# Patient Record
Sex: MALE | Race: WHITE | NOT HISPANIC OR LATINO | Employment: FULL TIME
[De-identification: names, ages, dates, MRNs, and addresses within clinical notes are randomized per-mention and may not be internally consistent; named-entity substitution may affect disease eponyms.]

---

## 2020-03-01 ENCOUNTER — HEALTH MAINTENANCE LETTER (OUTPATIENT)
Age: 25
End: 2020-03-01

## 2020-12-14 ENCOUNTER — HEALTH MAINTENANCE LETTER (OUTPATIENT)
Age: 25
End: 2020-12-14

## 2021-04-18 ENCOUNTER — HEALTH MAINTENANCE LETTER (OUTPATIENT)
Age: 26
End: 2021-04-18

## 2021-10-02 ENCOUNTER — HEALTH MAINTENANCE LETTER (OUTPATIENT)
Age: 26
End: 2021-10-02

## 2022-05-14 ENCOUNTER — HEALTH MAINTENANCE LETTER (OUTPATIENT)
Age: 27
End: 2022-05-14

## 2022-09-03 ENCOUNTER — HEALTH MAINTENANCE LETTER (OUTPATIENT)
Age: 27
End: 2022-09-03

## 2023-01-18 SDOH — ECONOMIC STABILITY: INCOME INSECURITY: HOW HARD IS IT FOR YOU TO PAY FOR THE VERY BASICS LIKE FOOD, HOUSING, MEDICAL CARE, AND HEATING?: NOT HARD AT ALL

## 2023-01-18 SDOH — ECONOMIC STABILITY: TRANSPORTATION INSECURITY
IN THE PAST 12 MONTHS, HAS LACK OF TRANSPORTATION KEPT YOU FROM MEETINGS, WORK, OR FROM GETTING THINGS NEEDED FOR DAILY LIVING?: NO

## 2023-01-18 SDOH — HEALTH STABILITY: PHYSICAL HEALTH: ON AVERAGE, HOW MANY MINUTES DO YOU ENGAGE IN EXERCISE AT THIS LEVEL?: 0 MIN

## 2023-01-18 SDOH — ECONOMIC STABILITY: TRANSPORTATION INSECURITY
IN THE PAST 12 MONTHS, HAS THE LACK OF TRANSPORTATION KEPT YOU FROM MEDICAL APPOINTMENTS OR FROM GETTING MEDICATIONS?: NO

## 2023-01-18 SDOH — HEALTH STABILITY: PHYSICAL HEALTH: ON AVERAGE, HOW MANY DAYS PER WEEK DO YOU ENGAGE IN MODERATE TO STRENUOUS EXERCISE (LIKE A BRISK WALK)?: 0 DAYS

## 2023-01-18 SDOH — ECONOMIC STABILITY: FOOD INSECURITY: WITHIN THE PAST 12 MONTHS, YOU WORRIED THAT YOUR FOOD WOULD RUN OUT BEFORE YOU GOT MONEY TO BUY MORE.: NEVER TRUE

## 2023-01-18 SDOH — ECONOMIC STABILITY: INCOME INSECURITY: IN THE LAST 12 MONTHS, WAS THERE A TIME WHEN YOU WERE NOT ABLE TO PAY THE MORTGAGE OR RENT ON TIME?: NO

## 2023-01-18 SDOH — ECONOMIC STABILITY: FOOD INSECURITY: WITHIN THE PAST 12 MONTHS, THE FOOD YOU BOUGHT JUST DIDN'T LAST AND YOU DIDN'T HAVE MONEY TO GET MORE.: NEVER TRUE

## 2023-01-18 ASSESSMENT — SOCIAL DETERMINANTS OF HEALTH (SDOH)
IN A TYPICAL WEEK, HOW MANY TIMES DO YOU TALK ON THE PHONE WITH FAMILY, FRIENDS, OR NEIGHBORS?: MORE THAN THREE TIMES A WEEK
DO YOU BELONG TO ANY CLUBS OR ORGANIZATIONS SUCH AS CHURCH GROUPS UNIONS, FRATERNAL OR ATHLETIC GROUPS, OR SCHOOL GROUPS?: NO
ARE YOU MARRIED, WIDOWED, DIVORCED, SEPARATED, NEVER MARRIED, OR LIVING WITH A PARTNER?: NEVER MARRIED
HOW OFTEN DO YOU GET TOGETHER WITH FRIENDS OR RELATIVES?: TWICE A WEEK
HOW OFTEN DO YOU ATTEND CHURCH OR RELIGIOUS SERVICES?: NEVER

## 2023-01-18 ASSESSMENT — LIFESTYLE VARIABLES
AUDIT-C TOTAL SCORE: 0
HOW OFTEN DO YOU HAVE SIX OR MORE DRINKS ON ONE OCCASION: NEVER
SKIP TO QUESTIONS 9-10: 1
HOW MANY STANDARD DRINKS CONTAINING ALCOHOL DO YOU HAVE ON A TYPICAL DAY: PATIENT DOES NOT DRINK
HOW OFTEN DO YOU HAVE A DRINK CONTAINING ALCOHOL: NEVER

## 2023-01-18 ASSESSMENT — ENCOUNTER SYMPTOMS
HEARTBURN: 0
SHORTNESS OF BREATH: 0
EYE PAIN: 0
HEMATOCHEZIA: 0
ABDOMINAL PAIN: 0
ARTHRALGIAS: 0
HEADACHES: 0
NAUSEA: 0
FREQUENCY: 1
JOINT SWELLING: 0
PALPITATIONS: 0
DIARRHEA: 0
MYALGIAS: 0
FEVER: 0
HEMATURIA: 0
PARESTHESIAS: 0
COUGH: 0
DIZZINESS: 0
CONSTIPATION: 0
SORE THROAT: 0
WEAKNESS: 0
CHILLS: 0
NERVOUS/ANXIOUS: 0
DYSURIA: 1

## 2023-01-19 ENCOUNTER — OFFICE VISIT (OUTPATIENT)
Dept: PEDIATRICS | Facility: CLINIC | Age: 28
End: 2023-01-19
Payer: COMMERCIAL

## 2023-01-19 VITALS
WEIGHT: 234 LBS | BODY MASS INDEX: 30.03 KG/M2 | RESPIRATION RATE: 16 BRPM | HEART RATE: 90 BPM | SYSTOLIC BLOOD PRESSURE: 122 MMHG | OXYGEN SATURATION: 98 % | HEIGHT: 74 IN | DIASTOLIC BLOOD PRESSURE: 84 MMHG | TEMPERATURE: 98.3 F

## 2023-01-19 DIAGNOSIS — Z00.00 ENCOUNTER FOR ROUTINE ADULT HEALTH EXAMINATION WITHOUT ABNORMAL FINDINGS: Primary | ICD-10-CM

## 2023-01-19 DIAGNOSIS — Z11.4 SCREENING FOR HIV (HUMAN IMMUNODEFICIENCY VIRUS): ICD-10-CM

## 2023-01-19 DIAGNOSIS — R30.0 DYSURIA: ICD-10-CM

## 2023-01-19 DIAGNOSIS — Z11.59 NEED FOR HEPATITIS C SCREENING TEST: ICD-10-CM

## 2023-01-19 LAB
ALBUMIN SERPL BCG-MCNC: 4.7 G/DL (ref 3.5–5.2)
ALBUMIN UR-MCNC: NEGATIVE MG/DL
ALP SERPL-CCNC: 89 U/L (ref 40–129)
ALT SERPL W P-5'-P-CCNC: 34 U/L (ref 10–50)
ANION GAP SERPL CALCULATED.3IONS-SCNC: 15 MMOL/L (ref 7–15)
APPEARANCE UR: CLEAR
AST SERPL W P-5'-P-CCNC: 28 U/L (ref 10–50)
BILIRUB SERPL-MCNC: 0.4 MG/DL
BILIRUB UR QL STRIP: NEGATIVE
BUN SERPL-MCNC: 10.8 MG/DL (ref 6–20)
CALCIUM SERPL-MCNC: 9.8 MG/DL (ref 8.6–10)
CHLORIDE SERPL-SCNC: 106 MMOL/L (ref 98–107)
CHOLEST SERPL-MCNC: 250 MG/DL
COLOR UR AUTO: YELLOW
CREAT SERPL-MCNC: 1.09 MG/DL (ref 0.67–1.17)
DEPRECATED HCO3 PLAS-SCNC: 21 MMOL/L (ref 22–29)
GFR SERPL CREATININE-BSD FRML MDRD: >90 ML/MIN/1.73M2
GLUCOSE SERPL-MCNC: 96 MG/DL (ref 70–99)
GLUCOSE UR STRIP-MCNC: NEGATIVE MG/DL
HBA1C MFR BLD: 5 % (ref 0–5.6)
HCV AB SERPL QL IA: NONREACTIVE
HDLC SERPL-MCNC: 38 MG/DL
HGB UR QL STRIP: NEGATIVE
HIV 1+2 AB+HIV1 P24 AG SERPL QL IA: NONREACTIVE
KETONES UR STRIP-MCNC: NEGATIVE MG/DL
LDLC SERPL CALC-MCNC: 193 MG/DL
LEUKOCYTE ESTERASE UR QL STRIP: NEGATIVE
NITRATE UR QL: NEGATIVE
NONHDLC SERPL-MCNC: 212 MG/DL
PH UR STRIP: 5.5 [PH] (ref 5–7)
POTASSIUM SERPL-SCNC: 4.3 MMOL/L (ref 3.4–5.3)
PROT SERPL-MCNC: 7.6 G/DL (ref 6.4–8.3)
RBC #/AREA URNS AUTO: NORMAL /HPF
SODIUM SERPL-SCNC: 142 MMOL/L (ref 136–145)
SP GR UR STRIP: 1.01 (ref 1–1.03)
TRIGL SERPL-MCNC: 93 MG/DL
UROBILINOGEN UR STRIP-ACNC: 0.2 E.U./DL
WBC #/AREA URNS AUTO: NORMAL /HPF

## 2023-01-19 PROCEDURE — 81001 URINALYSIS AUTO W/SCOPE: CPT | Performed by: INTERNAL MEDICINE

## 2023-01-19 PROCEDURE — 91312 COVID-19 VACCINE BIVALENT BOOSTER 12+ (PFIZER): CPT | Performed by: INTERNAL MEDICINE

## 2023-01-19 PROCEDURE — 80061 LIPID PANEL: CPT | Performed by: INTERNAL MEDICINE

## 2023-01-19 PROCEDURE — 80053 COMPREHEN METABOLIC PANEL: CPT | Performed by: INTERNAL MEDICINE

## 2023-01-19 PROCEDURE — 87491 CHLMYD TRACH DNA AMP PROBE: CPT | Performed by: INTERNAL MEDICINE

## 2023-01-19 PROCEDURE — 86803 HEPATITIS C AB TEST: CPT | Performed by: INTERNAL MEDICINE

## 2023-01-19 PROCEDURE — 83036 HEMOGLOBIN GLYCOSYLATED A1C: CPT | Performed by: INTERNAL MEDICINE

## 2023-01-19 PROCEDURE — 90471 IMMUNIZATION ADMIN: CPT | Performed by: INTERNAL MEDICINE

## 2023-01-19 PROCEDURE — 99385 PREV VISIT NEW AGE 18-39: CPT | Performed by: INTERNAL MEDICINE

## 2023-01-19 PROCEDURE — 0124A COVID-19 VACCINE BIVALENT BOOSTER 12+ (PFIZER): CPT | Performed by: INTERNAL MEDICINE

## 2023-01-19 PROCEDURE — 90686 IIV4 VACC NO PRSV 0.5 ML IM: CPT | Performed by: INTERNAL MEDICINE

## 2023-01-19 PROCEDURE — 87389 HIV-1 AG W/HIV-1&-2 AB AG IA: CPT | Performed by: INTERNAL MEDICINE

## 2023-01-19 PROCEDURE — 36415 COLL VENOUS BLD VENIPUNCTURE: CPT | Performed by: INTERNAL MEDICINE

## 2023-01-19 PROCEDURE — 87591 N.GONORRHOEAE DNA AMP PROB: CPT | Performed by: INTERNAL MEDICINE

## 2023-01-19 PROCEDURE — 99213 OFFICE O/P EST LOW 20 MIN: CPT | Mod: 25 | Performed by: INTERNAL MEDICINE

## 2023-01-19 ASSESSMENT — ENCOUNTER SYMPTOMS
ARTHRALGIAS: 0
FEVER: 0
PARESTHESIAS: 0
HEMATURIA: 0
PALPITATIONS: 0
DYSURIA: 1
ABDOMINAL PAIN: 0
NAUSEA: 0
SHORTNESS OF BREATH: 0
COUGH: 0
DIARRHEA: 0
HEMATOCHEZIA: 0
FREQUENCY: 1
HEARTBURN: 0
JOINT SWELLING: 0
NERVOUS/ANXIOUS: 0
EYE PAIN: 0
DIZZINESS: 0
SORE THROAT: 0
CONSTIPATION: 0
CHILLS: 0
WEAKNESS: 0
MYALGIAS: 0
HEADACHES: 0

## 2023-01-19 ASSESSMENT — PAIN SCALES - GENERAL: PAINLEVEL: NO PAIN (0)

## 2023-01-19 NOTE — PATIENT INSTRUCTIONS
"Cardio exercise is probably the most helpful thing for your heart and future health.  Try to get about 30 minutes of sustained cardio exercise (biking, running, swimming, fast walking) every other day or even every day.  Keeping your heart rate at a \"target\" of (220 - your age) x 0.80 will be your goal.  For example, if you're 60 years old, this would be (220 - 60) x 0.80 = 128 beats per minute for those 30 minutes of exercise.     Try to limit complex carbs (rice, bread, pasta, potatoes) and simple carbs (pop, juice, alcohol.)  Eating more lean proteins (chicken, fish, eggs, beans, nuts) and unlimited vegetables and fruits can definitely help as well.      Lab work today:  We can do labs in the exam room today, or you can get them done downstairs in the lab.      If you are going downstairs:  Directions:  As you walk through the first floor, you'll see (on the right) first the pharmacy, then some bathrooms, then the \"Lab and Imaging\" area. Give them your name at the window there and wait for them to call you.           "

## 2023-01-19 NOTE — PROGRESS NOTES
SUBJECTIVE:   CC: Ray is an 27 year old who presents for preventative health visit.   Patient has been advised of split billing requirements and indicates understanding: Yes  Healthy Habits:     Getting at least 3 servings of Calcium per day:  NO    Bi-annual eye exam:  Yes    Dental care twice a year:  NO    Sleep apnea or symptoms of sleep apnea:  Excessive snoring    Diet:  Regular (no restrictions)    Frequency of exercise:  None    Duration of exercise:  N/A    Taking medications regularly:  Yes    Barriers to taking medications:  None    Medication side effects:  Not applicable    PHQ-2 Total Score: 0    Additional concerns today:  Yes    Last few weeks has had some discomfort with penis; feels a bit of stinging near tip; does not hurt to urinate or ejaculate.  Feels like has some urgency, but not more volume.      No cloudiness or blood.  No exposure to sexually transmitted diseases.     No exercise.    Diet: not healthy; minimal fruits and veg; lots of meats and pasta            Today's PHQ-2 Score:   PHQ-2 ( 1999 [employer]) 1/18/2023   Q1: Little interest or pleasure in doing things 0   Q2: Feeling down, depressed or hopeless 0   PHQ-2 Score 0   Q1: Little interest or pleasure in doing things Several days   Q2: Feeling down, depressed or hopeless Not at all   PHQ-2 Score 1       Have you ever done Advance Care Planning? (For example, a Health Directive, POLST, or a discussion with a medical provider or your loved ones about your wishes): No, advance care planning information given to patient to review.  Patient declined advance care planning discussion at this time.    Social History     Tobacco Use     Smoking status: Never     Smokeless tobacco: Never   Substance Use Topics     Alcohol use: Yes     Comment: occasional     If you drink alcohol do you typically have >3 drinks per day or >7 drinks per week? No    Alcohol Use 1/19/2023   Prescreen: >3 drinks/day or >7 drinks/week? -   Prescreen: >3  drinks/day or >7 drinks/week? No       Last PSA: No results found for: PSA    Reviewed orders with patient. Reviewed health maintenance and updated orders accordingly - Yes  Lab work is in process  Labs reviewed in EPIC  BP Readings from Last 3 Encounters:   01/19/23 122/84   06/07/16 110/70   01/08/16 102/60    Wt Readings from Last 3 Encounters:   01/19/23 106.1 kg (234 lb)   06/07/16 86.2 kg (190 lb)   01/08/16 80.5 kg (177 lb 6 oz)                  There is no problem list on file for this patient.    No past surgical history on file.    Social History     Tobacco Use     Smoking status: Never     Smokeless tobacco: Never   Substance Use Topics     Alcohol use: Yes     Comment: occasional     Family History   Problem Relation Age of Onset     Hyperlipidemia Mother      Depression Mother      Hyperlipidemia Father      Coronary Artery Disease Maternal Grandfather      Cerebrovascular Disease Maternal Grandfather      Prostate Cancer Maternal Grandfather      Coronary Artery Disease Paternal Grandfather      Hypertension Paternal Grandfather      Hyperlipidemia Paternal Grandfather      Other Cancer Paternal Grandfather         Renal Cancer     Depression Paternal Grandfather      Thyroid Disease Paternal Grandfather      Asthma Paternal Grandmother      Osteoporosis Paternal Grandmother          No current outpatient medications on file.     Allergies   Allergen Reactions     Cats        Reviewed and updated as needed this visit by clinical staff   Tobacco  Allergies  Meds              Reviewed and updated as needed this visit by Provider                   No past medical history on file.   No past surgical history on file.    Review of Systems   Constitutional: Negative for chills and fever.   HENT: Negative for congestion, ear pain, hearing loss and sore throat.    Eyes: Negative for pain and visual disturbance.   Respiratory: Negative for cough and shortness of breath.    Cardiovascular: Negative for chest  "pain, palpitations and peripheral edema.   Gastrointestinal: Negative for abdominal pain, constipation, diarrhea, heartburn, hematochezia and nausea.   Genitourinary: Positive for dysuria and frequency. Negative for genital sores, hematuria, impotence, penile discharge and urgency.   Musculoskeletal: Negative for arthralgias, joint swelling and myalgias.   Skin: Negative for rash.   Neurological: Negative for dizziness, weakness, headaches and paresthesias.   Psychiatric/Behavioral: Negative for mood changes. The patient is not nervous/anxious.      CONSTITUTIONAL: NEGATIVE for fever, chills, change in weight  INTEGUMENTARY/SKIN: NEGATIVE for worrisome rashes, moles or lesions  EYES: NEGATIVE for vision changes or irritation  ENT: NEGATIVE for ear, mouth and throat problems  RESP: NEGATIVE for significant cough or SOB  CV: NEGATIVE for chest pain, palpitations or peripheral edema  GI: NEGATIVE for nausea, abdominal pain, heartburn, or change in bowel habits   male: negative for dysuria, hematuria, decreased urinary stream, erectile dysfunction, urethral discharge  MUSCULOSKELETAL: NEGATIVE for significant arthralgias or myalgia  NEURO: NEGATIVE for weakness, dizziness or paresthesias  PSYCHIATRIC: NEGATIVE for changes in mood or affect    OBJECTIVE:   /84   Pulse 90   Temp 98.3  F (36.8  C)   Resp 16   Ht 1.88 m (6' 2\")   Wt 106.1 kg (234 lb)   SpO2 98%   BMI 30.04 kg/m      Physical Exam  GENERAL: healthy, alert and no distress  EYES: Eyes grossly normal to inspection, PERRL and conjunctivae and sclerae normal  HENT: ear canals and TM's normal, nose and mouth without ulcers or lesions  NECK: no adenopathy, no asymmetry, masses, or scars and thyroid normal to palpation  RESP: lungs clear to auscultation - no rales, rhonchi or wheezes  CV: regular rate and rhythm, normal S1 S2, no S3 or S4, no murmur, click or rub, no peripheral edema and peripheral pulses strong  ABDOMEN: soft, nontender, no " hepatosplenomegaly, no masses and bowel sounds normal  MS: no gross musculoskeletal defects noted, no edema  SKIN: no suspicious lesions or rashes  NEURO: Normal strength and tone, mentation intact and speech normal  PSYCH: mentation appears normal, affect normal/bright    Diagnostic Test Results:  Labs reviewed in Epic    ASSESSMENT/PLAN:   (Z00.00) Encounter for routine adult health examination without abnormal findings  (primary encounter diagnosis)  Comment:   Plan: Comprehensive metabolic panel (BMP + Alb, Alk         Phos, ALT, AST, Total. Bili, TP), Lipid panel         reflex to direct LDL Fasting, Hemoglobin A1c        Discussed diet, exercise, testicular self exam, blood pressure, cholesterol, and need for cancer surveillance at appropriate ages.     (Z11.4) Screening for HIV (human immunodeficiency virus)  Comment:   Plan: HIV Antigen Antibody Combo            (Z11.59) Need for hepatitis C screening test  Comment:   Plan: Hepatitis C Screen Reflex to HCV RNA Quant and         Genotype            (R30.0) Dysuria  Comment:  Plan: UA Macro with Reflex to Micro and Culture - lab        collect, NEISSERIA GONORRHOEA PCR, CHLAMYDIA         TRACHOMATIS PCR, OFFICE/OUTPT VISIT,EST,LEVL         III        Screen for sexually transmitted disease and urinary tract infection.  Monitor for now; patient will call back for possible urology appointment if worsening.       Patient has been advised of split billing requirements and indicates understanding: Yes      COUNSELING:   Reviewed preventive health counseling, as reflected in patient instructions       Regular exercise       Healthy diet/nutrition       Vision screening       Hearing screening       Family planning       Prostate cancer screening        He reports that he has never smoked. He has never used smokeless tobacco.            Ceasar Monet MD  [facility]

## 2023-01-20 LAB
C TRACH DNA SPEC QL NAA+PROBE: NEGATIVE
N GONORRHOEA DNA SPEC QL NAA+PROBE: NEGATIVE

## 2024-01-30 ENCOUNTER — NURSE TRIAGE (OUTPATIENT)
Dept: NURSING | Facility: CLINIC | Age: 29
End: 2024-01-30
Payer: COMMERCIAL

## 2024-01-30 NOTE — TELEPHONE ENCOUNTER
Nurse Triage SBAR    Is this a 2nd Level Triage? NO    Situation: Right ear has been  ringing for 2 weeks with mild intermittent pain/pressure.    Background: NA    Assessment: Presently denies hearing loss or pain. Rates ringing as mild.    Protocol Recommended Disposition:   No disposition on file.    Recommendation: See in office within 3 days.     Appt made for tomorrow    Does the patient meet one of the following criteria for ADS visit consideration? 16+ years old, with an [facility] PCP   Bonnie Ibarra RN on 1/30/2024 at 5:08 PM    TIP  Providers, please consider if this condition is appropriate for management at one of our Acute and Diagnostic Services sites.     If patient is a good candidate, please use dotphrase <dot>triageresponse and select Refer to ADS to document.    Reason for Disposition   Symptoms only or mainly in 1 ear (unilateral tinnitus)    Additional Information   Negative: Patient sounds very sick or weak to the triager   Negative: Taking aspirin and dosage sounds high (i.e., > 1500 mg/day)   Negative: Hearing loss in one or both ears and sudden onset and present now   Negative: Ear is painful   Negative: Decreased hearing followed sudden, extremely loud noise (e.g., explosion, not just loud concert)   Negative: Taking medication that can damage hearing (i.e., gentamycin, tobramycin, furosemide, ethacrynic acid, cisplatin, quinidine)   Negative: Painful rash with multiple small blisters grouped together (i.e., dermatomal distribution or 'band' or 'stripe' on ear or one side of face)   Negative: MODERATE-SEVERE tinnitus (i.e., interferes with work, school, or sleep)    Protocols used: Tinnitus-A-OH

## 2024-01-31 ENCOUNTER — OFFICE VISIT (OUTPATIENT)
Dept: PEDIATRICS | Facility: CLINIC | Age: 29
End: 2024-01-31
Payer: COMMERCIAL

## 2024-01-31 ENCOUNTER — MYC MEDICAL ADVICE (OUTPATIENT)
Dept: PEDIATRICS | Facility: CLINIC | Age: 29
End: 2024-01-31

## 2024-01-31 VITALS
SYSTOLIC BLOOD PRESSURE: 134 MMHG | HEART RATE: 94 BPM | TEMPERATURE: 98.6 F | RESPIRATION RATE: 16 BRPM | BODY MASS INDEX: 30.04 KG/M2 | OXYGEN SATURATION: 99 % | WEIGHT: 234 LBS | DIASTOLIC BLOOD PRESSURE: 76 MMHG

## 2024-01-31 DIAGNOSIS — H93.11 TINNITUS, RIGHT: Primary | ICD-10-CM

## 2024-01-31 DIAGNOSIS — H93.A1 PULSATILE TINNITUS OF RIGHT EAR: ICD-10-CM

## 2024-01-31 PROCEDURE — 99214 OFFICE O/P EST MOD 30 MIN: CPT | Mod: GC

## 2024-01-31 PROCEDURE — 90480 ADMN SARSCOV2 VAC 1/ONLY CMP: CPT

## 2024-01-31 PROCEDURE — 90686 IIV4 VACC NO PRSV 0.5 ML IM: CPT

## 2024-01-31 PROCEDURE — 91320 SARSCV2 VAC 30MCG TRS-SUC IM: CPT

## 2024-01-31 PROCEDURE — 90471 IMMUNIZATION ADMIN: CPT

## 2024-01-31 ASSESSMENT — PAIN SCALES - GENERAL: PAINLEVEL: NO PAIN (0)

## 2024-01-31 NOTE — PROGRESS NOTES
Assessment & Plan   (H93.11) Tinnitus, right  (primary encounter diagnosis)  (H93.A1) Pulsatile tinnitus of right ear  Comment: Presenting with acute onset right-sided tinnitus that is occasionally pulsatile.  Denies any left-sided symptoms.  Additionally endorses right-sided jaw pain and muffled hearing on the right side.  Differential includes aneurysm vs brain mass (vestibular schwannoma) vs infection vs medication side effect (less likely). Suspect that linear scarring on right sided TM not related to acute presentation. No signs of cerumen impaction. Concern for aneurysm vs AV malformation given pulsatile nature and acute onset. Will proceed with MRA and MRI of brain with ENT referral for further evaluation.  Plan: Adult ENT  Referral, MR Brain w/o & w         Contrast, MRA Brain ([facility] [facility]) wo & w         Contrast    Follow up with ENT. Additional follow up pending imaging results and recommendations from ENT.    Miguelito Friend MD  PGY-2, Internal Medicine - Pediatrics    Sharon Bell is a 28 year old, presenting for the following health issues:  Ear Problem        1/31/2024     3:28 PM   Additional Questions   Roomed by Monalisa         1/31/2024     3:28 PM   Patient Reported Additional Medications   Patient reports taking the following new medications None     History of Present Illness       Reason for visit:  Ringing and clogged feeling in right ear  Symptom onset:  1-2 weeks ago  Symptoms include:  Ringing in right ear, feeling of it being clogged, mild discomfort that comes and goes, muffled sounds and voices at times  Symptom intensity:  Mild  Symptom progression:  Staying the same  Had these symptoms before:  No  What makes it worse:  N/A  What makes it better:  Using a fan at night and listening to music at work help make the ringing less noticeable    He eats 0-1 servings of fruits and vegetables daily.He consumes 1 sweetened beverage(s) daily.He exercises with enough  effort to increase his heart rate 9 or less minutes per day.  He exercises with enough effort to increase his heart rate 3 or less days per week.   He is taking medications regularly.     Ringing in the right ear starting about 2 weeks ago, tinnitus is consistent and may change in pitch, unknown triggers that cause changes in pitch. No recent injuries to the ear. Occasional pulsating. constant clogged feeling and ringing in ear, discomfort in the right ear, feeling of fullness. No change in hearing, feeling more muffled but able to hear. Additionally noticing more dull jaw pain. Unknown alleviating or aggravating factors. Have not used Q-tips in ears. No recent infections, chest pain, shortness of breath. Stable since it has started. No dizziness, lightheadedness, changes in vision, fevers, chills. No associated headaches. No vertigo symptoms. Using a fan to distract him from sleep. Does endorse a history of ear infections as a child.    No changes in left ear hearing or feeling. Not debilitating.    Grandparents with stroke, but unknown other family history. Family history of cancer: Paternal grandfather with skin, leukemia and lung cancer. History of cancer on maternal side as well.    ROS: 10 point ROS neg other than the symptoms noted above in the HPI.        Objective    /76   Pulse 94   Temp 98.6  F (37  C)   Resp 16   Wt 106.1 kg (234 lb)   SpO2 99%   BMI 30.04 kg/m    Body mass index is 30.04 kg/m .  Physical Exam  Constitutional:       General: He is not in acute distress.     Appearance: Normal appearance. He is normal weight. He is not toxic-appearing.   HENT:      Head: Normocephalic and atraumatic.      Right Ear: Ear canal and external ear normal. No laceration, drainage, swelling or tenderness. There is no impacted cerumen. No foreign body. Tympanic membrane is scarred (Linear scarring from the center of the TM). Tympanic membrane is not perforated, erythematous or bulging.      Left Ear:  Tympanic membrane, ear canal and external ear normal. No decreased hearing noted. No drainage, swelling or tenderness. There is no impacted cerumen. No foreign body.      Nose: Nose normal.   Eyes:      General: No scleral icterus.     Extraocular Movements: Extraocular movements intact.   Cardiovascular:      Rate and Rhythm: Normal rate and regular rhythm.      Heart sounds: No murmur heard.     No gallop.   Pulmonary:      Effort: Pulmonary effort is normal. No respiratory distress.      Breath sounds: Normal breath sounds. No wheezing.   Abdominal:      General: There is no distension.   Skin:     General: Skin is warm and dry.   Neurological:      Mental Status: He is alert and oriented to person, place, and time.      Cranial Nerves: Cranial nerves 2-12 are intact. No dysarthria or facial asymmetry.   Psychiatric:         Mood and Affect: Mood normal.         Behavior: Behavior normal.        Signed Electronically by: Miguelito Friend MD

## 2024-01-31 NOTE — PATIENT INSTRUCTIONS
We will order an MRI and MRA to assess the one sided hearing loss and order a referral to ENT. We will be in touch once the results of the imaging studies are finalized.     If you noticed any new severe headaches, or one sided weakness or new dizziness, please go to the emergency room.

## 2024-02-06 ENCOUNTER — TELEPHONE (OUTPATIENT)
Dept: OTOLARYNGOLOGY | Facility: CLINIC | Age: 29
End: 2024-02-06
Payer: COMMERCIAL

## 2024-02-06 NOTE — TELEPHONE ENCOUNTER
[facility] Call Center    Phone Message    May a detailed message be left on voicemail: yes     Reason for Call: Other: Pt returning call to Maddie Rodriguez,  Please call him back.  [place] location, Thanks     Action Taken: Other: ENT    Travel Screening: Not Applicable

## 2024-04-27 ENCOUNTER — HEALTH MAINTENANCE LETTER (OUTPATIENT)
Age: 29
End: 2024-04-27

## 2024-05-15 ENCOUNTER — OFFICE VISIT (OUTPATIENT)
Dept: AUDIOLOGY | Facility: CLINIC | Age: 29
End: 2024-05-15
Payer: COMMERCIAL

## 2024-05-15 DIAGNOSIS — H93.13 TINNITUS OF BOTH EARS: Primary | ICD-10-CM

## 2024-05-15 PROCEDURE — 92567 TYMPANOMETRY: CPT | Performed by: AUDIOLOGIST

## 2024-05-15 PROCEDURE — 92557 COMPREHENSIVE HEARING TEST: CPT | Performed by: AUDIOLOGIST

## 2024-05-15 NOTE — PROGRESS NOTES
AUDIOLOGY REPORT     SUMMARY: Audiology visit completed. See audiogram for results.       RECOMMENDATIONS: Follow-up with ENT.    Stephania Key, CCC-A  [place] Licensed Audiologist #[id number]

## 2024-07-17 ENCOUNTER — PATIENT OUTREACH (OUTPATIENT)
Dept: CARE COORDINATION | Facility: CLINIC | Age: 29
End: 2024-07-17
Payer: COMMERCIAL

## 2024-11-26 NOTE — PROGRESS NOTES
CHIEF COMPLAINT: Patient presents with:  Tinnitus: Bilateral tinnitus audio on 11/22. More noticeable in R ear, with dull pain and aural fullness. Sx for about a year. Non-pulsatile. No hearing concerns. No known noise exposure.          HISTORY OF PRESENT ILLNESS    Ray was seen at the behest of Taylor Espinosa MD for hearing concern.  He reports nasal congestion that worse at night.  He snores at night.  No AM headache.  Only occasional daytime sleepiness.  No witnessed apneas.     HISTORY: New to Dr. Person referred due to tinnitus. Audio 05/15/2024 showed normal hearing bilat. Pt reports dull pain and aural fullness in the right ear and constant bilateral tinnitus more pronounced right than left since late 2023. Denies difficulty hearing, otorrhea, dizziness, prior ear surgery, family history of hearing loss, and noise exposure. RESULTS: Otoscopy: Clear bilat. Tymps: Hypercompliant, bilat. Reflexes: Present in all conditions ipsi/contra at 1000 Hz bilat. Audio: Normal hearing, bilat. Stable per 05/15/2024. REC: To ENT as planned. Recheck per ENT.            REVIEW OF SYSTEMS    Review of Systems as per HPI and PMHx, otherwise 10 system review system are negative.       ALLERGIES    Cats    CURRENT MEDICATIONS      Current Outpatient Medications:     azelastine (ASTELIN) 0.1 % nasal spray, 2 sprays each nostril 1-2x daily as needed for nasal congestion (use nightly for first 2 week), Disp: 30 mL, Rfl: 11    Multiple Vitamins-Minerals (DAILY MULTI PO), Take by mouth daily., Disp: , Rfl:      PAST MEDICAL HISTORY    PAST MEDICAL HISTORY: No past medical history on file.    PAST SURGICAL HISTORY    PAST SURGICAL HISTORY: No past surgical history on file.    FAMILY  HISTORY    FAMILY HISTORY:   Family History   Problem Relation Age of Onset    Hyperlipidemia Mother     Depression Mother     Hyperlipidemia Father     Coronary Artery Disease Maternal Grandfather     Cerebrovascular Disease Maternal Grandfather      Prostate Cancer Maternal Grandfather     Coronary Artery Disease Paternal Grandfather     Hypertension Paternal Grandfather     Hyperlipidemia Paternal Grandfather     Other Cancer Paternal Grandfather         Renal Cancer    Depression Paternal Grandfather     Thyroid Disease Paternal Grandfather     Asthma Paternal Grandmother     Osteoporosis Paternal Grandmother        SOCIAL HISTORY    SOCIAL HISTORY:   Social History     Tobacco Use    Smoking status: Never    Smokeless tobacco: Never   Substance Use Topics    Alcohol use: Yes     Comment: occasional        PHYSICAL EXAM    HEAD: Normal appearance and symmetry:  No cutaneous lesions.      NECK:  supple     EARS:    Right:   TM intact   LEFT:   TM intact    EYES:  EOMI    CN VII/XII:  intact     NOSE:     Dorsum:   upper 1/3 to LEFT  Septum:  slight convexity LEFT  Mucosa:  moist        ORAL CAVITY/OROPHARYNX:     Lips:  Normal.  Tongue: normal, midline  Mucosa:   no lesions     NECK:  Trachea:  midline.              Thyroid:  normal              Adenopathy:  none        NEURO:   Alert and Oriented     GAIT AND STATION:  normal     RESPIRATORY:   Symmetry and Respiratory effort     PSYCH:  Normal mood and affect     SKIN:   warm and dry         IMPRESSION:    Encounter Diagnoses   Name Primary?    Nasal congestion Yes    Normal hearing exam     Tinnitus, bilateral     Snores     Deviated nasal septum           RECOMMENDATIONS:    Tinnitus masking strategies dicussed.   Consider sleep study if not response to nasal spray (send message via [employer])  Return annually for hearing test

## 2024-11-27 ENCOUNTER — OFFICE VISIT (OUTPATIENT)
Dept: OTOLARYNGOLOGY | Facility: CLINIC | Age: 29
End: 2024-11-27
Payer: COMMERCIAL

## 2024-11-27 DIAGNOSIS — J34.2 DEVIATED NASAL SEPTUM: ICD-10-CM

## 2024-11-27 DIAGNOSIS — H93.13 TINNITUS, BILATERAL: ICD-10-CM

## 2024-11-27 DIAGNOSIS — R06.83 SNORES: ICD-10-CM

## 2024-11-27 DIAGNOSIS — Z01.10 NORMAL HEARING EXAM: ICD-10-CM

## 2024-11-27 DIAGNOSIS — R09.81 NASAL CONGESTION: Primary | ICD-10-CM

## 2024-11-27 PROCEDURE — 99203 OFFICE O/P NEW LOW 30 MIN: CPT | Performed by: OTOLARYNGOLOGY

## 2024-11-27 RX ORDER — AZELASTINE 1 MG/ML
SPRAY, METERED NASAL
Qty: 30 ML | Refills: 11 | Status: SHIPPED | OUTPATIENT
Start: 2024-11-27

## 2024-11-27 NOTE — PATIENT INSTRUCTIONS
Tinnitus masking strategies dicussed.   Consider sleep study if not response to nasal spray (send message via [employer])  Return annually for hearing test

## 2024-11-27 NOTE — LETTER
11/27/2024      Ray Person  [address]  [facility] [address]      Dear Colleague,    Thank you for referring your patient, Ray Person, to the [facility]. Please see a copy of my visit note below.    CHIEF COMPLAINT: Patient presents with:  Tinnitus: Bilateral tinnitus audio on 11/22. More noticeable in R ear, with dull pain and aural fullness. Sx for about a year. Non-pulsatile. No hearing concerns. No known noise exposure.          HISTORY OF PRESENT ILLNESS    Ray was seen at the behest of Taylor Espinosa MD for hearing concern.  He reports nasal congestion that worse at night.  He snores at night.  No AM headache.  Only occasional daytime sleepiness.  No witnessed apneas.     HISTORY: New to Dr. Person referred due to tinnitus. Audio 05/15/2024 showed normal hearing bilat. Pt reports dull pain and aural fullness in the right ear and constant bilateral tinnitus more pronounced right than left since late 2023. Denies difficulty hearing, otorrhea, dizziness, prior ear surgery, family history of hearing loss, and noise exposure. RESULTS: Otoscopy: Clear bilat. Tymps: Hypercompliant, bilat. Reflexes: Present in all conditions ipsi/contra at 1000 Hz bilat. Audio: Normal hearing, bilat. Stable per 05/15/2024. REC: To ENT as planned. Recheck per ENT.            REVIEW OF SYSTEMS    Review of Systems as per HPI and PMHx, otherwise 10 system review system are negative.       ALLERGIES    Cats    CURRENT MEDICATIONS      Current Outpatient Medications:      azelastine (ASTELIN) 0.1 % nasal spray, 2 sprays each nostril 1-2x daily as needed for nasal congestion (use nightly for first 2 week), Disp: 30 mL, Rfl: 11     Multiple Vitamins-Minerals (DAILY MULTI PO), Take by mouth daily., Disp: , Rfl:      PAST MEDICAL HISTORY    PAST MEDICAL HISTORY: No past medical history on file.    PAST SURGICAL HISTORY    PAST SURGICAL HISTORY: No past surgical history on  file.    FAMILY  HISTORY    FAMILY HISTORY:   Family History   Problem Relation Age of Onset     Hyperlipidemia Mother      Depression Mother      Hyperlipidemia Father      Coronary Artery Disease Maternal Grandfather      Cerebrovascular Disease Maternal Grandfather      Prostate Cancer Maternal Grandfather      Coronary Artery Disease Paternal Grandfather      Hypertension Paternal Grandfather      Hyperlipidemia Paternal Grandfather      Other Cancer Paternal Grandfather         Renal Cancer     Depression Paternal Grandfather      Thyroid Disease Paternal Grandfather      Asthma Paternal Grandmother      Osteoporosis Paternal Grandmother        SOCIAL HISTORY    SOCIAL HISTORY:   Social History     Tobacco Use     Smoking status: Never     Smokeless tobacco: Never   Substance Use Topics     Alcohol use: Yes     Comment: occasional        PHYSICAL EXAM    HEAD: Normal appearance and symmetry:  No cutaneous lesions.      NECK:  supple     EARS:    Right:   TM intact   LEFT:   TM intact    EYES:  EOMI    CN VII/XII:  intact     NOSE:     Dorsum:   upper 1/3 to LEFT  Septum:  slight convexity LEFT  Mucosa:  moist        ORAL CAVITY/OROPHARYNX:     Lips:  Normal.  Tongue: normal, midline  Mucosa:   no lesions     NECK:  Trachea:  midline.              Thyroid:  normal              Adenopathy:  none        NEURO:   Alert and Oriented     GAIT AND STATION:  normal     RESPIRATORY:   Symmetry and Respiratory effort     PSYCH:  Normal mood and affect     SKIN:   warm and dry         IMPRESSION:    Encounter Diagnoses   Name Primary?     Nasal congestion Yes     Normal hearing exam      Tinnitus, bilateral      Snores      Deviated nasal septum           RECOMMENDATIONS:    Tinnitus masking strategies dicussed.   Consider sleep study if not response to nasal spray (send message via [employer])  Return annually for hearing test      Again, thank you for allowing me to participate in the care of your patient.         Sincerely,        Fawad Person MD

## 2025-05-11 ENCOUNTER — HEALTH MAINTENANCE LETTER (OUTPATIENT)
Age: 30
End: 2025-05-11